# Patient Record
Sex: FEMALE | Race: WHITE | URBAN - METROPOLITAN AREA
[De-identification: names, ages, dates, MRNs, and addresses within clinical notes are randomized per-mention and may not be internally consistent; named-entity substitution may affect disease eponyms.]

---

## 2017-06-20 ENCOUNTER — OFFICE VISIT (OUTPATIENT)
Dept: FAMILY MEDICINE | Facility: CLINIC | Age: 22
End: 2017-06-20
Payer: COMMERCIAL

## 2017-06-20 VITALS — SYSTOLIC BLOOD PRESSURE: 121 MMHG | HEART RATE: 96 BPM | WEIGHT: 95.6 LBS | DIASTOLIC BLOOD PRESSURE: 82 MMHG

## 2017-06-20 DIAGNOSIS — Z31.9 DESIRE FOR PREGNANCY: ICD-10-CM

## 2017-06-20 DIAGNOSIS — L01.00 CHRONIC SYMMETRICAL IMPETIGO: ICD-10-CM

## 2017-06-20 DIAGNOSIS — Z30.432 ENCOUNTER FOR IUD REMOVAL: Primary | ICD-10-CM

## 2017-06-20 PROCEDURE — 58301 REMOVE INTRAUTERINE DEVICE: CPT | Performed by: FAMILY MEDICINE

## 2017-06-20 PROCEDURE — 99203 OFFICE O/P NEW LOW 30 MIN: CPT | Mod: 25 | Performed by: FAMILY MEDICINE

## 2017-06-20 RX ORDER — DOXYCYCLINE 100 MG/1
100 CAPSULE ORAL DAILY
Qty: 24 CAPSULE | Refills: 0 | Status: SHIPPED | OUTPATIENT
Start: 2017-06-20

## 2017-06-20 NOTE — PATIENT INSTRUCTIONS
Thank you for choosing [facility].  You may be receiving a survey in the mail from [facility] regarding your visit today.  Please take a few minutes to complete and return the survey to let us know how we are doing.  Our Clinic hours are:  Mondays    7:20 am - 7 pm  Tues -  Fri  7:20 am - 5 pm  Clinic Phone: [phone]  The clinic lab opens at 7:30 am Mon - Fri and appointments are required.  [facility]. [phone]  Monday-Thursday 8 am - 7pm  Tues/Wed/Fri 8 am - 5:30 pm

## 2017-06-20 NOTE — MR AVS SNAPSHOT
"              After Visit Summary   6/20/2017    Lenora Baez    MRN: [id number]           Patient Information     Date Of Birth          1995        Visit Information        Provider Department      6/20/2017 4:00 PM Tessy Jacobo MD [facility]        Today's Diagnoses     Chronic symmetrical impetigo    -  1    Desire for pregnancy          Care Instructions      Thank you for choosing [facility].  You may be receiving a survey in the mail from [facility] regarding your visit today.  Please take a few minutes to complete and return the survey to let us know how we are doing.  Our Clinic hours are:  Mondays    7:20 am - 7 pm  Tues -  Fri  7:20 am - 5 pm  Clinic Phone: [phone]  The clinic lab opens at 7:30 am Mon - Fri and appointments are required.  [facility]  Ph. [phone]  Monday-Thursday 8 am - 7pm  Tues/Wed/Fri 8 am - 5:30 pm          Follow-ups after your visit        Who to contact     If you have questions or need follow up information about today's clinic visit or your schedule please contact [facility] directly at [phone].  Normal or non-critical lab and imaging results will be communicated to you by MyChart, letter or phone within 4 business days after the clinic has received the results. If you do not hear from us within 7 days, please contact the clinic through [employer] or phone. If you have a critical or abnormal lab result, we will notify you by phone as soon as possible.  Submit refill requests through [employer] or call your pharmacy and they will forward the refill request to us. Please allow 3 business days for your refill to be completed.          Additional Information About Your Visit        [employer] Information     [employer] lets you send messages to your doctor, view your test results, renew your prescriptions, schedule appointments and more. To sign up, go to [email or web address] . Click on \"Log in\" " "on the left side of the screen, which will take you to the Welcome page. Then click on \"Sign up Now\" on the right side of the page.     You will be asked to enter the access code listed below, as well as some personal information. Please follow the directions to create your username and password.     Your access code is: [email or web address]  Expires: 2017  6:13 PM     Your access code will  in 90 days. If you need help or a new code, please call your [facility] or [phone].        Care EveryWhere ID     This is your Care EveryWhere ID. This could be used by other organizations to access your [place] medical records  [id number]        Your Vitals Were     Pulse                   96            Blood Pressure from Last 3 Encounters:   17 121/82    Weight from Last 3 Encounters:   17 95 lb 9.6 oz (43.4 kg)              We Performed the Following     REMOVE INTRAUTERINE DEVICE          Today's Medication Changes          These changes are accurate as of: 17  6:13 PM.  If you have any questions, ask your nurse or doctor.               Start taking these medicines.        Dose/Directions    doxycycline Monohydrate 100 MG Caps   Used for:  Chronic symmetrical impetigo        Dose:  100 mg   Take 1 capsule (100 mg) by mouth daily   Quantity:  24 capsule   Refills:  0       mupirocin 2 % nasal ointment   Commonly known as:  BACTROBAN NASAL   Used for:  Chronic symmetrical impetigo        Dose:  1 g   Apply 1 g into each nare 3 times daily for 5 days Ok to repeat course one week later PRN.   Quantity:  22 g   Refills:  1            Where to get your medicines      These medications were sent to [email or web address] - [facility] [address]  [address] [address]     Phone:  [phone]     doxycycline Monohydrate 100 MG Caps    mupirocin 2 % nasal ointment                Primary Care Provider    None Specified       No primary provider on file.        Thank you!     Thank " you for choosing [facility]  for your care. Our goal is always to provide you with excellent care. Hearing back from our patients is one way we can continue to improve our services. Please take a few minutes to complete the written survey that you may receive in the mail after your visit with us. Thank you!             Your Updated Medication List - Protect others around you: Learn how to safely use, store and throw away your medicines at www.disposemymeds.org.          This list is accurate as of: 6/20/17  6:13 PM.  Always use your most recent med list.                   Brand Name Dispense Instructions for use    doxycycline Monohydrate 100 MG Caps     24 capsule    Take 1 capsule (100 mg) by mouth daily       mupirocin 2 % nasal ointment    BACTROBAN NASAL    22 g    Apply 1 g into each nare 3 times daily for 5 days Ok to repeat course one week later PRN.

## 2017-06-20 NOTE — PROGRESS NOTES
SUBJECTIVE:                                                    Lenora Baez is a 22 year old female who presents to clinic today for the following health issues:    Chief Complaint   Patient presents with     IUD     remove IUD, She is getting  on August 23rd. LMP: irregular periods every four months.     Recheck Medication     requesting a refill of her doxycycline for acne     New Patient     [place] patient -transportation #[phone]     Lenora Baez is a 22 year old female whose home is in [place], but she is currently in treatment at [facility], scheduled to finish her time there in about three weeks. She has a 3 1/2 year old child, is planning to get  August 23 and is desirous of getting pregnant again but plans to delay intercourse until after her wedding. She has a Paragard IUD in place that was put in six weeks after her last childbirth, and although she has generally liked it, her periods have been heavier lately, so in view of that and her desire for conception, she would like it removed. She reports she has been diagnosed with PCOS and her periods are relatively infrequent.    She is just finishing menses.    IN addition, she has been diagnosed with recurrent impetigo, and her dermatologist placed her on doxycycline, which she would like renewed through her stay at [facility]. She has also used nasal bactroban from time to time.    OBJECTIVE: /82 (BP Location: Right arm, Patient Position: Chair, Cuff Size: Adult Regular)  Pulse 96  Wt 95 lb 9.6 oz (43.4 kg)    External genitals are blood stained but otherwise normal.  Vagina shows some mucousy blood which was cleared with Antonio swabs. The strings were then visible at the os, <1 cm in length. They were grasped with a ring forceps and the IUD easily removed.    ASSESSMENT:/PLAN:      ICD-10-CM    1. Encounter for IUD removal Z30.432    2. Desire for pregnancy Z31.9 REMOVE INTRAUTERINE DEVICE   3. Chronic symmetrical  impetigo L01.00 mupirocin (BACTROBAN NASAL) 2 % nasal ointment     doxycycline Monohydrate 100 MG CAPS     Tessy Jacobo md

## 2017-06-20 NOTE — NURSING NOTE
Chief Complaint   Patient presents with     IUD     remove IUD, She is getting  on August 23rd. LMP: irregular periods every four months.     Recheck Medication     requesting a refill of her doxycycline for acne     New Patient     [place] patient -transportation #[phone]       Initial /82 (BP Location: Right arm, Patient Position: Chair, Cuff Size: Adult Regular)  Pulse 96  Wt 95 lb 9.6 oz (43.4 kg) There is no height or weight on file to calculate BMI.  Medication Reconciliation: complete   Marci Valadez [facility]    Patient given 1 tablet 325mg acetaminophen for IUD removal cramping